# Patient Record
Sex: MALE | Race: WHITE | NOT HISPANIC OR LATINO | URBAN - METROPOLITAN AREA
[De-identification: names, ages, dates, MRNs, and addresses within clinical notes are randomized per-mention and may not be internally consistent; named-entity substitution may affect disease eponyms.]

---

## 2023-08-02 ENCOUNTER — WEB ENCOUNTER (OUTPATIENT)
Dept: URBAN - METROPOLITAN AREA CLINIC 94 | Facility: CLINIC | Age: 58
End: 2023-08-02

## 2023-08-08 ENCOUNTER — OFFICE VISIT (OUTPATIENT)
Dept: URBAN - METROPOLITAN AREA CLINIC 94 | Facility: CLINIC | Age: 58
End: 2023-08-08
Payer: COMMERCIAL

## 2023-08-08 ENCOUNTER — LAB OUTSIDE AN ENCOUNTER (OUTPATIENT)
Dept: URBAN - METROPOLITAN AREA CLINIC 94 | Facility: CLINIC | Age: 58
End: 2023-08-08

## 2023-08-08 ENCOUNTER — DASHBOARD ENCOUNTERS (OUTPATIENT)
Age: 58
End: 2023-08-08

## 2023-08-08 VITALS
SYSTOLIC BLOOD PRESSURE: 145 MMHG | HEIGHT: 68 IN | HEART RATE: 66 BPM | TEMPERATURE: 97.3 F | BODY MASS INDEX: 28.79 KG/M2 | WEIGHT: 190 LBS | DIASTOLIC BLOOD PRESSURE: 54 MMHG

## 2023-08-08 DIAGNOSIS — Z12.11 COLON CANCER SCREENING: ICD-10-CM

## 2023-08-08 PROCEDURE — 99242 OFF/OP CONSLTJ NEW/EST SF 20: CPT | Performed by: INTERNAL MEDICINE

## 2023-08-08 PROCEDURE — 99202 OFFICE O/P NEW SF 15 MIN: CPT | Performed by: INTERNAL MEDICINE

## 2023-08-08 RX ORDER — BUSPIRONE HYDROCHLORIDE 10 MG/1
1 TABLET TABLET ORAL TWICE A DAY
Status: ACTIVE | COMMUNITY

## 2023-08-08 RX ORDER — CITALOPRAM 40 MG/1
0.5 TABLET TABLET, FILM COATED ORAL ONCE A DAY
Status: ACTIVE | COMMUNITY

## 2023-08-08 RX ORDER — POLYETHYLENE GLYCOL-3350 AND ELECTROLYTES 236; 6.74; 5.86; 2.97; 22.74 G/274.31G; G/274.31G; G/274.31G; G/274.31G; G/274.31G
4000 ML AS DIRECTED POWDER, FOR SOLUTION ORAL ONCE
Qty: 4000 ML | Refills: 0 | OUTPATIENT
Start: 2023-08-08 | End: 2023-08-09

## 2023-08-08 NOTE — HPI-TODAY'S VISIT:
58 yo M evaluated today for screening colonoscopy. Pt referred to clinic by PCP, DR Rosetta Hernandez. A copy of patient records will be faxed to referring MD for review.   Pt denies ever having a colonoscopy and denies a family hx of colon cancer. Pt admits to eating a very poor diet of crackers and DR Pepper daily. He has irregular bowel habits which he attributes to his diet. Has reported BR blood only on occasion.  May reports occasional lower abdominal pain but nothing severe or often. Denies unintentional wt loss.

## 2023-08-08 NOTE — PHYSICAL EXAM CONSTITUTIONAL:
well developed, well nourished , in no acute distress , ambulating without difficulty , normal communication ability
wound check

## 2023-08-17 ENCOUNTER — OFFICE VISIT (OUTPATIENT)
Dept: URBAN - METROPOLITAN AREA SURGERY CENTER 17 | Facility: SURGERY CENTER | Age: 58
End: 2023-08-17
Payer: COMMERCIAL

## 2023-08-17 ENCOUNTER — TELEPHONE ENCOUNTER (OUTPATIENT)
Dept: URBAN - METROPOLITAN AREA CLINIC 94 | Facility: CLINIC | Age: 58
End: 2023-08-17

## 2023-08-17 ENCOUNTER — CLAIMS CREATED FROM THE CLAIM WINDOW (OUTPATIENT)
Dept: URBAN - METROPOLITAN AREA CLINIC 4 | Facility: CLINIC | Age: 58
End: 2023-08-17
Payer: COMMERCIAL

## 2023-08-17 DIAGNOSIS — D12.7 ADENOMA DETERMINED BY COLORECTAL BIOPSY: ICD-10-CM

## 2023-08-17 DIAGNOSIS — D12.4 ADENOMA OF DESCENDING COLON: ICD-10-CM

## 2023-08-17 DIAGNOSIS — Z12.11 COLON CANCER SCREENING: ICD-10-CM

## 2023-08-17 DIAGNOSIS — C18.9 MALIGNANT NEOPLASM OF COLON, UNSPECIFIED: ICD-10-CM

## 2023-08-17 DIAGNOSIS — C18.7 ADENOCARCINOMA OF SIGMOID COLON: ICD-10-CM

## 2023-08-17 PROCEDURE — G8907 PT DOC NO EVENTS ON DISCHARG: HCPCS | Performed by: INTERNAL MEDICINE

## 2023-08-17 PROCEDURE — 45331 SIGMOIDOSCOPY AND BIOPSY: CPT | Performed by: INTERNAL MEDICINE

## 2023-08-17 PROCEDURE — 45338 SIGMOIDOSCOPY W/TUMR REMOVE: CPT | Performed by: INTERNAL MEDICINE

## 2023-08-17 PROCEDURE — 88342 IMHCHEM/IMCYTCHM 1ST ANTB: CPT | Performed by: PATHOLOGY

## 2023-08-17 PROCEDURE — 88305 TISSUE EXAM BY PATHOLOGIST: CPT | Performed by: PATHOLOGY

## 2023-08-17 PROCEDURE — 88341 IMHCHEM/IMCYTCHM EA ADD ANTB: CPT | Performed by: PATHOLOGY

## 2023-08-17 PROCEDURE — 45335 SIGMOIDOSCOPY W/SUBMUC INJ: CPT | Performed by: INTERNAL MEDICINE

## 2023-08-17 RX ORDER — CITALOPRAM 40 MG/1
0.5 TABLET TABLET, FILM COATED ORAL ONCE A DAY
Status: ACTIVE | COMMUNITY

## 2023-08-17 RX ORDER — BUSPIRONE HYDROCHLORIDE 10 MG/1
1 TABLET TABLET ORAL TWICE A DAY
Status: ACTIVE | COMMUNITY

## 2023-08-18 ENCOUNTER — TELEPHONE ENCOUNTER (OUTPATIENT)
Dept: URBAN - METROPOLITAN AREA CLINIC 94 | Facility: CLINIC | Age: 58
End: 2023-08-18

## 2023-08-24 ENCOUNTER — TELEPHONE ENCOUNTER (OUTPATIENT)
Dept: URBAN - METROPOLITAN AREA CLINIC 23 | Facility: CLINIC | Age: 58
End: 2023-08-24

## 2023-08-30 ENCOUNTER — TELEPHONE ENCOUNTER (OUTPATIENT)
Dept: URBAN - METROPOLITAN AREA CLINIC 23 | Facility: CLINIC | Age: 58
End: 2023-08-30